# Patient Record
Sex: FEMALE | Race: WHITE | NOT HISPANIC OR LATINO | Employment: PART TIME | URBAN - NONMETROPOLITAN AREA
[De-identification: names, ages, dates, MRNs, and addresses within clinical notes are randomized per-mention and may not be internally consistent; named-entity substitution may affect disease eponyms.]

---

## 2022-01-24 ENCOUNTER — CONSULT (OUTPATIENT)
Dept: UROLOGY | Facility: CLINIC | Age: 67
End: 2022-01-24
Payer: OTHER GOVERNMENT

## 2022-01-24 VITALS — SYSTOLIC BLOOD PRESSURE: 138 MMHG | DIASTOLIC BLOOD PRESSURE: 88 MMHG | HEART RATE: 70 BPM

## 2022-01-24 DIAGNOSIS — R31.29 MICROHEMATURIA: Primary | ICD-10-CM

## 2022-01-24 DIAGNOSIS — N28.1 COMPLEX RENAL CYST: ICD-10-CM

## 2022-01-24 LAB
SL AMB  POCT GLUCOSE, UA: NORMAL
SL AMB LEUKOCYTE ESTERASE,UA: NORMAL
SL AMB POCT BILIRUBIN,UA: NORMAL
SL AMB POCT BLOOD,UA: NORMAL
SL AMB POCT CLARITY,UA: CLEAR
SL AMB POCT COLOR,UA: YELLOW
SL AMB POCT KETONES,UA: NORMAL
SL AMB POCT NITRITE,UA: NORMAL
SL AMB POCT PH,UA: 5
SL AMB POCT SPECIFIC GRAVITY,UA: 1.03
SL AMB POCT URINE PROTEIN: NORMAL
SL AMB POCT UROBILINOGEN: 0.2

## 2022-01-24 PROCEDURE — 81002 URINALYSIS NONAUTO W/O SCOPE: CPT | Performed by: NURSE PRACTITIONER

## 2022-01-24 PROCEDURE — 99202 OFFICE O/P NEW SF 15 MIN: CPT | Performed by: NURSE PRACTITIONER

## 2022-01-24 RX ORDER — CLONAZEPAM 1 MG/1
TABLET ORAL
COMMUNITY
Start: 2021-08-02

## 2022-01-24 RX ORDER — ATORVASTATIN CALCIUM 40 MG/1
TABLET, FILM COATED ORAL
COMMUNITY
Start: 2021-08-02

## 2022-01-24 RX ORDER — ONDANSETRON 8 MG/1
TABLET, ORALLY DISINTEGRATING ORAL
COMMUNITY
Start: 2021-08-02

## 2022-01-24 RX ORDER — ERGOCALCIFEROL (VITAMIN D2) 1250 MCG
CAPSULE ORAL
COMMUNITY
Start: 2021-11-02

## 2022-01-24 NOTE — PROGRESS NOTES
Assessment and plan:     Microhematuria  · Send urine microscopic  · Repeat in 6 months if positive will recommend cystoscopy    Complex renal cyst  · US in 6 months  · Follow up 6 months        CHRIS Maldonado    History of Present Illness     Quique Friend is a 61 y o  new patient who presents for micro hematuria complex renal cyst   She had MRI of the abdomen with and without contrast 07/22/2021 that revealed evidence of a partially involuted proteinaceous/complex cyst in the upper pole of the right kidney as well as a simple cortical cyst in both kidneys  This was noted in 2019 and has gotten smaller, now measuring 1 4 x 1 5 cm  MRI from 2019 revealed an exophytic rounded lesion in the upper pole of the right kidney measuring 2 1 x 2 1 cm  The lesion demonstrates marked T1 hyperintensity and T2 hypointensity, with some internal heterogeneity  No definitive internal enhancement  Microhematuria noted on her POC urine dip  She denies any gross hematuria  Never a smoker  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      Laboratory     No results found for: BUN, CREATININE    No components found for: GFR    No results found for: GLUCOSE, CALCIUM, NA, K, CO2, CL    No results found for: WBC, HGB, HCT, MCV, PLT    No results found for: PSA    Recent Results (from the past 1 hour(s))   POCT urine dip    Collection Time: 01/24/22  8:18 AM   Result Value Ref Range    LEUKOCYTE ESTERASE,UA -     NITRITE,UA -     SL AMB POCT UROBILINOGEN 0 2     POCT URINE PROTEIN -      PH,UA 5 0     BLOOD,UA mciro trace     SPECIFIC GRAVITY,UA 1 030     KETONES,UA -     BILIRUBIN,UA -     GLUCOSE, UA -      COLOR,UA yellow     CLARITY,UA clear        @RESULT(URINEMICROSCOPIC)@    @RESULT(URINECULTURE)@    Radiology     Examination: MRI of the abdomen without and with contrast     Comparisons: MRI of the abdomen without and with contrast dated 10/17/2019 and a   CT scan of the abdomen and pelvis dated 9/12/2019  INDICATION: 56-year-old a symptomatically female for follow-up of a right renal   lesion  TECHNIQUE: Multiplanar, multisequence MRI of the abdomen with attention to the   kidneys is performed at 1 [address] [place] before and after the uneventful intravenous   administration of 10 cc of Gadavist      FINDINGS: Evaluation of the kidneys demonstrates a 1 4 x 1 5 cm exophytic   structure arising from the upper pole of the right kidney previously measuring   2 1 x 2 1 cm with evidence of interval reduction in size  Signal characteristics   of this lesion are stable  The lesion is precontrast T1 hyperintense and T2   hypointense with no appreciable enhancement identified within the lesion on the   postcontrast images  The findings indicate a partially involuted proteinaceous   or complex cyst in the upper pole of the right kidney  Note is made of a stable   2 cm simple cyst in the lateral interpolar region of the right kidney and a   stable 1 8 cm simple cortical cyst in the interpolar region of the left kidney  There are other tiny cortical subcentimeter nonenhancing structures compatible   with very small cysts  There is no suspicious renal lesion  The kidneys enhance   symmetrically without evidence to indicate obstructive uropathy  The liver, spleen, pancreas and adrenal glands are unremarkable  There is no   biliary ductal dilatation  The gallbladder is unremarkable  The stomach and visualized small bowel and colon are unremarkable  There is no free fluid/ascites in the abdomen  There is normal signal throughout   the abdominal fat  No enlarged lymph nodes are noted in the abdomen  The abdominal vasculature is unremarkable  The lung bases and the base of the heart are unremarkable  The body wall soft tissue and osseous structures are normal in signal and   morphology on all pulse sequences      Procedure Note    Chai Ramey MD - 07/22/2021   Formatting of this note might be different from the original    Examination: MRI of the abdomen without and with contrast     Comparisons: MRI of the abdomen without and with contrast dated 10/17/2019 and a   CT scan of the abdomen and pelvis dated 9/12/2019  INDICATION: 61-year-old a symptomatically female for follow-up of a right renal   lesion  TECHNIQUE: Multiplanar, multisequence MRI of the abdomen with attention to the   kidneys is performed at 1 5 [place] before and after the uneventful intravenous   administration of 10 cc of Gadavist      FINDINGS: Evaluation of the kidneys demonstrates a 1 4 x 1 5 cm exophytic   structure arising from the upper pole of the right kidney previously measuring   2 1 x 2 1 cm with evidence of interval reduction in size  Signal characteristics   of this lesion are stable  The lesion is precontrast T1 hyperintense and T2   hypointense with no appreciable enhancement identified within the lesion on the   postcontrast images  The findings indicate a partially involuted proteinaceous   or complex cyst in the upper pole of the right kidney  Note is made of a stable   2 cm simple cyst in the lateral interpolar region of the right kidney and a   stable 1 8 cm simple cortical cyst in the interpolar region of the left kidney  There are other tiny cortical subcentimeter nonenhancing structures compatible   with very small cysts  There is no suspicious renal lesion  The kidneys enhance   symmetrically without evidence to indicate obstructive uropathy  The liver, spleen, pancreas and adrenal glands are unremarkable  There is no   biliary ductal dilatation  The gallbladder is unremarkable  The stomach and visualized small bowel and colon are unremarkable  There is no free fluid/ascites in the abdomen  There is normal signal throughout   the abdominal fat  No enlarged lymph nodes are noted in the abdomen  The abdominal vasculature is unremarkable       The lung bases and the base of the heart are unremarkable  The body wall soft tissue and osseous structures are normal in signal and   morphology on all pulse sequences  IMPRESSION:   IMPRESSION:     1  Evidence of a partially involuted proteinaceous/complex cyst in the upper   pole of the right kidney as noted  2  Evidence of simple cortical cysts in both kidneys otherwise  3  No evidence of an acute or suspicious process in the lower chest or abdomen  4  Follow-up renal ultrasounds should be considered to confirm ongoing   stability  Review of Systems     Review of Systems   Constitutional: Negative for activity change, appetite change, chills, fatigue, fever and unexpected weight change  HENT: Negative for facial swelling  Eyes: Negative for discharge  Respiratory: Negative  Negative for cough and shortness of breath  Cardiovascular: Negative for chest pain and leg swelling  Gastrointestinal: Negative  Negative for abdominal distention, abdominal pain, constipation, diarrhea, nausea and vomiting  IBS   Endocrine: Negative  Genitourinary: Negative  Negative for decreased urine volume, difficulty urinating, dysuria, enuresis, flank pain, frequency, genital sores, hematuria and urgency  Musculoskeletal: Negative for back pain and myalgias  Skin: Negative for pallor and rash  Allergic/Immunologic: Negative  Negative for immunocompromised state  Neurological: Negative for facial asymmetry and speech difficulty  Psychiatric/Behavioral: Negative for agitation and confusion  Allergies     No Known Allergies    Physical Exam     Physical Exam  Constitutional:       General: She is not in acute distress  Appearance: Normal appearance  She is normal weight  She is not ill-appearing, toxic-appearing or diaphoretic  HENT:      Head: Normocephalic and atraumatic  Eyes:      General: No scleral icterus  Cardiovascular:      Rate and Rhythm: Normal rate     Pulmonary:      Effort: Pulmonary effort is normal  No respiratory distress  Abdominal:      General: Abdomen is flat  There is no distension  Palpations: Abdomen is soft  Tenderness: There is no abdominal tenderness  There is no right CVA tenderness, left CVA tenderness, guarding or rebound  Musculoskeletal:         General: No swelling  Cervical back: Normal range of motion  Right lower leg: No edema  Left lower leg: No edema  Skin:     General: Skin is warm and dry  Coloration: Skin is not jaundiced or pale  Findings: No rash  Neurological:      General: No focal deficit present  Mental Status: She is alert and oriented to person, place, and time  Gait: Gait normal    Psychiatric:         Mood and Affect: Mood normal          Behavior: Behavior normal          Thought Content: Thought content normal          Judgment: Judgment normal          Vital Signs     Vitals:    01/24/22 0813   BP: 138/88   Pulse: 70       Current Medications       Current Outpatient Medications:     atorvastatin (LIPITOR) 40 mg tablet, , Disp: , Rfl:     clonazePAM (KlonoPIN) 1 mg tablet, , Disp: , Rfl:     ergocalciferol (ERGOCALCIFEROL) 1 25 MG (88524 UT) capsule, , Disp: , Rfl:     ondansetron (ZOFRAN-ODT) 8 mg disintegrating tablet, , Disp: , Rfl:     Active Problems     Patient Active Problem List   Diagnosis    Microhematuria    Complex renal cyst       Past Medical History     Past Medical History:   Diagnosis Date    Diabetes ([facility] [address] )     IBS (irritable bowel syndrome)        Surgical History     Past Surgical History:   Procedure Laterality Date    COLONOSCOPY      HYSTERECTOMY         Family History     History reviewed  No pertinent family history      Social History     Social History     Social History     Tobacco Use   Smoking Status Never Smoker   Smokeless Tobacco Never Used       Past Surgical History:   Procedure Laterality Date    COLONOSCOPY      HYSTERECTOMY           The following portions of the patient's history were reviewed and updated as appropriate: allergies, current medications, past family history, past medical history, past social history, past surgical history and problem list    Please note :  Voice dictation software has been used to create this document  There may be inadvertent transcription errors      [id number] [id number] [address]uevas

## 2022-01-24 NOTE — PATIENT INSTRUCTIONS
BLADDER HEALTH    WHAT IS CONSIDERED NORMAL?  The average bladder can hold about 2 cups of urine before it needs to be emptied   The normal range of voiding urine is 6 to 8 times during a 24 hour period  As we get older, our bladder capacity can get smaller and we may need to pass urine more frequently but usually not more than every 2 hours   Urine should flow easily without discomfort in a good, steady stream until the bladder is empty  No pushing or straining is necessary to empty the bladder   An urge is a signal that you feel as the bladder stretches to fill with urine  Urges can be felt even if the bladder is not full  Urges are not commands to go to the toilet, merely a signal and can be controlled  WHAT ARE GOOD BLADDER HABITS?  Take your time when emptying your bladder  Dont strain or push to empty your bladder  Make sure you empty your bladder completely each time you pass urine  Do not rush the process   Consistently ignoring the urge to go (waiting more than 4 hours between toileting) or urinating too infrequently may be convenient but not healthy for your bladder   Avoid going to the toilet just in case or more often than every 2 hours  It is usually not necessary to go when you feel the first urge  Try to go only when your bladder is full  Urgency and frequency of urination can be improved by retraining the bladder and spacing your fluid intake throughout the day  Practice good toilet habits  Dont let your bladder control your life  TIPS TO MAINTAIN GOOD BLADDER HABITS   Maintain a good fluid intake  Depending on your body size and environment, drink 6 -8 cups (8 oz each) of fluid per day unless otherwise advised by your doctor  Not enough fluid creates a foul odor and dark color of the urine     Limit the amount of caffeine (coffee, cola, chocolate or tea) and citrus foods that you consume as these foods can be associated with increased sensation of urinary urgency and frequency   Limit the amount of alcohol you drink  Alcohol increases urine production and makes it difficult for the brain to coordinate bladder control   Avoid constipation by maintaining a balanced diet of dietary fiber   Cigarette smoking is also irritating to the bladder surface and is associated with bladder cancer  In addition, the coughing associated with smoking may lead to increased incontinent episodes because of the increased pressure  HOW DIET CAN AFFECT YOUR BLADDER  Although there is no particular "diet" that can cure bladder control, there are certain dietary suggestions you can use to help control the problem  There are 2 points to consider when evaluating how your habits and diet may affect your bladder:    1  Foods and fluids can irritate the bladder  Some foods and beverages are thought to contribute to bladder leakage and irritability  However their effect on the bladder is not completely understood and you may want to see if eliminating one or all of these items improves your bladder control  If you are unable to give them up completely, it is recommended that you use the following items in moderation:   Acidic beverages and foods (orange juice, grapefruit juice, lemonade etc)   Alcoholic beverages   Vinegar   Coffee (regular and decaf)   Tea (regular and decaf)   Caffeinated beverages   Carbonated beverages          2  Drinking enough and the right kinds of fluids  Many people with bladder control issues decrease their intake of liquids in hope that they will need to urinate less frequently or have less urinary leakage   You should not restrict fluids to control your bladder  While a decrease in liquid intake does result in a decrease in the volume of urine, the smaller amount of urine may be more highly concentrated         Highly concentrated, dark yellow urine is irritating to the bladder surface and may actually cause you to go to the bathroom more frequently   It also encourages the growth of bacteria, which may lead to infections resulting in incontinence   Substitutions for Bladder Irritants: water is always the best beverage choice  Grape and apple juice are thirst quenchers are good selections and are not as irritating to the bladder   o Low acid fruits:  Pears, apricots, papaya, watermelon  o For coffee drinkers: KAVA®, Postum®, Nima®, Kaffree [place]®  o For tea drinkers:  non-citrus or herbal and sun brewed tea    Kidney Cyst   WHAT YOU NEED TO KNOW:   What is a kidney cyst?  A kidney cyst is a fluid-filled sac that grows in your kidney  A simple cyst usually does not contain cancer  A complex cyst contains calcium deposits and needs to be checked over time for cancer  You may have one or more cysts  Both kidneys may form cysts at the same time  What increases my risk for a kidney cyst?   · Age older than 48 years    · Male gender    · A disease that affects how your kidneys work    · High blood pressure    · Smoking cigarettes    · Family history of a kidney cyst    What are the signs and symptoms of a kidney cyst?  Kidney cysts usually do not cause symptoms  A cyst that grows large may cause pain or discomfort in your side or abdomen  You may have blood in your urine or dark urine, or develop high blood pressure  Tenderness along with pain and a fever may be a sign of an infected cyst   How are kidney cysts diagnosed? Kidney cysts are usually found during tests for another reason  Once the cyst is found, you may need the following:  · An ultrasound, CT, or MRI  may be used to take pictures of your kidneys and any cysts  You may be given contrast liquid to help your kidneys show up better in the pictures  Tell the healthcare provider if you have ever had an allergic reaction to contrast liquid  Do not enter the MRI room with any metal  Metal may cause serious injury   Tell the healthcare provider if you have any metal in or on your body     · Blood tests  may be used to check your kidney function  How are kidney cysts treated? Your cyst may need no treatment  Your healthcare provider may want you to have tests to check the size of the cyst over time  You may also need tests if you have hard deposits in the cyst  The deposits will be checked for cancer or other material that can cause health problems  Fluid may need to be drained from a cyst that becomes infected, bursts, or blocks blood or urine flow in the kidney  Surgery may be needed if the cyst is large  What can I do to prevent or manage kidney cysts? · Drink liquids as directed  Liquids help your kidneys work correctly  They can also help prevent a urinary tract infection  Ask your healthcare provider how much liquid to have each day and which liquids are best for you  Ask if you need to limit or not drink alcohol  Alcohol may damage your kidneys  · Manage health conditions  Over time, conditions such as diabetes and high blood pressure that are not controlled may damage your kidneys  · Do not smoke  Smoking may narrow blood vessels in your kidneys and raise your blood pressure  Smoking can also damage your kidneys  Ask your healthcare provider for information if you currently smoke and need help quitting  E-cigarettes or smokeless tobacco still contain nicotine  Talk to your healthcare provider before you use these products  When should I seek immediate care? · You have blood in your urine or your urine is dark  · You have trouble urinating, or you are urinating more often than usual     · You have a fever along with pain or tenderness below your ribcage and above your hip bones  When should I contact my healthcare provider? · You have questions or concerns about your condition or care  CARE AGREEMENT:   You have the right to help plan your care  Learn about your health condition and how it may be treated   Discuss treatment options with your healthcare providers to decide what care you want to receive  You always have the right to refuse treatment  The above information is an  only  It is not intended as medical advice for individual conditions or treatments  Talk to your doctor, nurse or pharmacist before following any medical regimen to see if it is safe and effective for you  © Copyright [employer] 2021 Information is for End User's use only and may not be sold, redistributed or otherwise used for commercial purposes   All illustrations and images included in CareNotes® are the copyrighted property of A D A M , Inc  or [address] [place] [address]

## 2022-08-05 ENCOUNTER — TELEPHONE (OUTPATIENT)
Dept: FAMILY MEDICINE CLINIC | Facility: CLINIC | Age: 67
End: 2022-08-05

## 2022-08-16 NOTE — TELEPHONE ENCOUNTER
Called and lm for patient to call and schedule appt   Did sent [facility] message with appt date and time  Of October 20th at 9:45 am and to call if it does not work